# Patient Record
Sex: FEMALE
[De-identification: names, ages, dates, MRNs, and addresses within clinical notes are randomized per-mention and may not be internally consistent; named-entity substitution may affect disease eponyms.]

---

## 2020-03-03 ENCOUNTER — APPOINTMENT (OUTPATIENT)
Dept: ORTHOPEDIC SURGERY | Facility: CLINIC | Age: 55
End: 2020-03-03
Payer: COMMERCIAL

## 2020-03-03 DIAGNOSIS — M77.11 LATERAL EPICONDYLITIS, RIGHT ELBOW: ICD-10-CM

## 2020-03-03 PROBLEM — Z00.00 ENCOUNTER FOR PREVENTIVE HEALTH EXAMINATION: Status: ACTIVE | Noted: 2020-03-03

## 2020-03-03 PROCEDURE — 99203 OFFICE O/P NEW LOW 30 MIN: CPT

## 2020-03-03 PROCEDURE — 73080 X-RAY EXAM OF ELBOW: CPT | Mod: RT

## 2020-03-04 PROBLEM — M77.11 RIGHT LATERAL EPICONDYLITIS: Status: ACTIVE | Noted: 2020-03-04

## 2020-03-04 NOTE — DISCUSSION/SUMMARY
[de-identified] : 54 year old female presents with right lateral epicondylitis.\par \par I discussed with the patient the treatment of lateral epicondylitis. I discussed that this is a degenerative condition rather than an inflammatory condition and that the process tends to be reversible (albeit can last from 6-24mos if left untreated). The best source of treatment is to reduce the offending repetitive overuse injury process and I counseled the patient on how to do this. First line treatment can include watchful waiting for a period of 6-8 wks with specific activity modification and OTC NSAID's/Tylenol. Further treatment includes the use of counterforce bracing, physical therapy for stretching and strengthening, and the use of injection therapy (steroids/PRP etc). I also discussed the potential role of surgical intervention for chronic symptoms that persist greater than 6-12 months.\par \par At this time as treatment I recommended a period of relative rest, stretching and strengthening modalities as indicated in a patient handout provided as well as counterforce bracing. If no improvement over the next few months, additional treatment such as corticosteroid injection versus formal physical therapy can be performed.\par \par HEP given. Rx for counterforce brace given.\par \par We will see the patient back as needed.\par \par

## 2020-03-04 NOTE — HISTORY OF PRESENT ILLNESS
[de-identified] : 54 year old LHD female presents today right elbow pain since January 2020. She started to have pain after helping her son move to college. The pain is constant worse with lifting, and extension. Localizes the pain to the lateral epicondyle. NSAIDs/ Tylenol are not helpful. Denies numbness or tingling. \par \par The patient's past medical history, past surgical history, medications and allergies were reviewed by me today with the patient and documented accordingly. In addition, the patient's family and social history, which were noncontributory to this visit, were reviewed also.

## 2020-03-04 NOTE — ADDENDUM
[FreeTextEntry1] : This note was written by Lauren Little on 03/03/2020 acting solely as a scribe for Dr. Dalton Finney.\par \par All medical record entries made by the Scribe were at my, Dr. Dalton Finney, direction and personally dictated by me on 03/03/2020. I have personally reviewed the chart and agree that the record accurately reflects my personal performance of the history, physical exam, assessment and plan.\par

## 2020-03-04 NOTE — PHYSICAL EXAM
[de-identified] : Oriented to time, place, person\par Mood: Normal\par Affect: Normal\par Appearance: Healthy, well appearing, no acute distress.\par Gait: Normal\par Assistive Devices: None\par \par Right elbow exam\par \par Skin: Clean, dry, intact. No ecchymosis. No swelling. No palpable joint effusion.\par ROM: Full extension/flexion, full supination/pronation.  \par Painful ROM: Lateral elbow pain with resisted wrist extension\par Tenderness: No medial epicondyle pain. Positive lateral epicondyle pain (ECRB). No olecranon pain. No pain at radial head. No pain to radial tunnel.\par Strength: 5/5 elbow flexion, 5/5 elbow extension, 5/5 supination, 5/5 pronation\par Stability: Stable to vaus/valgus stress\par Vasc: 2+ radial pulse, <2s cap refill\par Sensation: In tact to light touch throughout\par Neuro: Negative tinels at ulnar canal, AIN/PIN/Ulnar nerve in tact to motor/sensation. [de-identified] : The following radiographs were ordered and read by me during this patients visit. I reviewed each radiograph in detail with the patient and discussed the findings as highlighted below. \par \par 3 views of the right elbow were obtained today, 03/03/2020, that show no acute fracture or dislocation. There is no degenerative change seen. There is no gross malalignment. No significant other obvious osseous abnormality, otherwise unremarkable.\par \par

## 2020-11-23 ENCOUNTER — TRANSCRIPTION ENCOUNTER (OUTPATIENT)
Age: 55
End: 2020-11-23

## 2023-08-12 ENCOUNTER — NON-APPOINTMENT (OUTPATIENT)
Age: 58
End: 2023-08-12

## 2023-08-14 ENCOUNTER — NON-APPOINTMENT (OUTPATIENT)
Age: 58
End: 2023-08-14

## 2025-07-07 ENCOUNTER — APPOINTMENT (OUTPATIENT)
Dept: FAMILY MEDICINE | Facility: CLINIC | Age: 60
End: 2025-07-07